# Patient Record
Sex: FEMALE | Race: BLACK OR AFRICAN AMERICAN | Employment: OTHER | URBAN - METROPOLITAN AREA
[De-identification: names, ages, dates, MRNs, and addresses within clinical notes are randomized per-mention and may not be internally consistent; named-entity substitution may affect disease eponyms.]

---

## 2017-07-27 ENCOUNTER — OFFICE VISIT (OUTPATIENT)
Dept: INTERNAL MEDICINE | Age: 77
End: 2017-07-27
Payer: MEDICARE

## 2017-07-27 VITALS
BODY MASS INDEX: 27.34 KG/M2 | DIASTOLIC BLOOD PRESSURE: 92 MMHG | HEART RATE: 77 BPM | SYSTOLIC BLOOD PRESSURE: 143 MMHG | HEIGHT: 62 IN | WEIGHT: 148.6 LBS

## 2017-07-27 DIAGNOSIS — Z13.9 SCREENING: ICD-10-CM

## 2017-07-27 DIAGNOSIS — G89.29 CHRONIC BILATERAL LOW BACK PAIN WITHOUT SCIATICA: ICD-10-CM

## 2017-07-27 DIAGNOSIS — E55.9 VITAMIN D DEFICIENCY: ICD-10-CM

## 2017-07-27 DIAGNOSIS — R73.03 PRE-DIABETES: ICD-10-CM

## 2017-07-27 DIAGNOSIS — Z00.00 PERIODIC HEALTH ASSESSMENT, GENERAL SCREENING, ADULT: ICD-10-CM

## 2017-07-27 DIAGNOSIS — M54.50 CHRONIC BILATERAL LOW BACK PAIN WITHOUT SCIATICA: ICD-10-CM

## 2017-07-27 DIAGNOSIS — I10 ESSENTIAL HYPERTENSION: Primary | ICD-10-CM

## 2017-07-27 LAB — HBA1C MFR BLD: 6.4 %

## 2017-07-27 PROCEDURE — 99204 OFFICE O/P NEW MOD 45 MIN: CPT | Performed by: INTERNAL MEDICINE

## 2017-07-27 PROCEDURE — 83036 HEMOGLOBIN GLYCOSYLATED A1C: CPT | Performed by: INTERNAL MEDICINE

## 2017-07-27 PROCEDURE — 99213 OFFICE O/P EST LOW 20 MIN: CPT

## 2017-07-27 RX ORDER — VALSARTAN 160 MG/1
160 TABLET ORAL DAILY
COMMUNITY
End: 2017-07-27 | Stop reason: SDUPTHER

## 2017-07-27 RX ORDER — FERROUS SULFATE 325(65) MG
325 TABLET ORAL
COMMUNITY
End: 2017-08-24

## 2017-07-27 RX ORDER — HYDRALAZINE HYDROCHLORIDE 25 MG/1
25 TABLET, FILM COATED ORAL 3 TIMES DAILY
COMMUNITY
End: 2017-07-27

## 2017-07-27 RX ORDER — GABAPENTIN 100 MG/1
100 CAPSULE ORAL 3 TIMES DAILY
Qty: 90 CAPSULE | Refills: 0 | Status: SHIPPED | OUTPATIENT
Start: 2017-07-27 | End: 2017-09-06 | Stop reason: SDUPTHER

## 2017-07-27 RX ORDER — VALSARTAN 160 MG/1
160 TABLET ORAL DAILY
Qty: 30 TABLET | Refills: 1 | Status: SHIPPED | OUTPATIENT
Start: 2017-07-27 | End: 2017-09-06 | Stop reason: SDUPTHER

## 2017-07-27 RX ORDER — OXYCODONE HYDROCHLORIDE AND ACETAMINOPHEN 5; 325 MG/1; MG/1
2 TABLET ORAL EVERY 6 HOURS PRN
COMMUNITY
End: 2017-08-24

## 2017-07-27 RX ORDER — PROCHLORPERAZINE MALEATE 10 MG
10 TABLET ORAL EVERY 6 HOURS PRN
COMMUNITY
End: 2017-08-24

## 2017-07-27 RX ORDER — ATORVASTATIN CALCIUM 20 MG/1
20 TABLET, FILM COATED ORAL DAILY
Qty: 30 TABLET | Refills: 3 | Status: SHIPPED | OUTPATIENT
Start: 2017-07-27 | End: 2020-08-10 | Stop reason: ALTCHOICE

## 2017-07-27 RX ORDER — GABAPENTIN 300 MG/1
300 CAPSULE ORAL DAILY
COMMUNITY
End: 2017-07-27

## 2017-07-27 RX ORDER — TAMSULOSIN HYDROCHLORIDE 0.4 MG/1
0.4 CAPSULE ORAL DAILY
COMMUNITY
End: 2017-08-24

## 2017-07-27 RX ORDER — MORPHINE SULFATE 15 MG/1
15 TABLET ORAL EVERY 12 HOURS
COMMUNITY
End: 2017-08-24

## 2017-07-27 RX ORDER — ATORVASTATIN CALCIUM 20 MG/1
20 TABLET, FILM COATED ORAL DAILY
COMMUNITY
End: 2017-07-27 | Stop reason: SDUPTHER

## 2017-07-27 RX ORDER — METFORMIN HYDROCHLORIDE 500 MG/1
500 TABLET, EXTENDED RELEASE ORAL
COMMUNITY
End: 2017-08-24

## 2017-07-27 RX ORDER — GABAPENTIN 100 MG/1
100 CAPSULE ORAL 3 TIMES DAILY
COMMUNITY
End: 2017-07-27 | Stop reason: SDUPTHER

## 2017-07-27 RX ORDER — HYDRALAZINE HYDROCHLORIDE 50 MG/1
50 TABLET, FILM COATED ORAL 3 TIMES DAILY
COMMUNITY
End: 2017-07-27 | Stop reason: SDUPTHER

## 2017-07-27 RX ORDER — BLOOD PRESSURE TEST KIT
KIT MISCELLANEOUS
Qty: 1 KIT | Refills: 0 | Status: SHIPPED | OUTPATIENT
Start: 2017-07-27 | End: 2017-08-24

## 2017-07-27 RX ORDER — ACETAMINOPHEN AND CODEINE PHOSPHATE 300; 30 MG/1; MG/1
1 TABLET ORAL DAILY PRN
Qty: 30 TABLET | Refills: 0 | Status: SHIPPED | OUTPATIENT
Start: 2017-07-27 | End: 2017-08-06

## 2017-07-27 RX ORDER — LORATADINE 10 MG/1
10 CAPSULE, LIQUID FILLED ORAL DAILY
COMMUNITY
End: 2017-08-24

## 2017-07-27 RX ORDER — HYDRALAZINE HYDROCHLORIDE 25 MG/1
25 TABLET, FILM COATED ORAL 3 TIMES DAILY
Qty: 90 TABLET | Refills: 0 | Status: SHIPPED | OUTPATIENT
Start: 2017-07-27 | End: 2017-09-06 | Stop reason: SDUPTHER

## 2017-07-27 ASSESSMENT — PATIENT HEALTH QUESTIONNAIRE - PHQ9
4. FEELING TIRED OR HAVING LITTLE ENERGY: 2
3. TROUBLE FALLING OR STAYING ASLEEP: 0
SUM OF ALL RESPONSES TO PHQ QUESTIONS 1-9: 4
5. POOR APPETITE OR OVEREATING: 2
6. FEELING BAD ABOUT YOURSELF - OR THAT YOU ARE A FAILURE OR HAVE LET YOURSELF OR YOUR FAMILY DOWN: 0
2. FEELING DOWN, DEPRESSED OR HOPELESS: 0
SUM OF ALL RESPONSES TO PHQ9 QUESTIONS 1 & 2: 0
8. MOVING OR SPEAKING SO SLOWLY THAT OTHER PEOPLE COULD HAVE NOTICED. OR THE OPPOSITE, BEING SO FIGETY OR RESTLESS THAT YOU HAVE BEEN MOVING AROUND A LOT MORE THAN USUAL: 0
7. TROUBLE CONCENTRATING ON THINGS, SUCH AS READING THE NEWSPAPER OR WATCHING TELEVISION: 0
9. THOUGHTS THAT YOU WOULD BE BETTER OFF DEAD, OR OF HURTING YOURSELF: 0
1. LITTLE INTEREST OR PLEASURE IN DOING THINGS: 0
10. IF YOU CHECKED OFF ANY PROBLEMS, HOW DIFFICULT HAVE THESE PROBLEMS MADE IT FOR YOU TO DO YOUR WORK, TAKE CARE OF THINGS AT HOME, OR GET ALONG WITH OTHER PEOPLE: 0

## 2017-07-27 ASSESSMENT — ENCOUNTER SYMPTOMS
DOUBLE VISION: 0
HEMOPTYSIS: 0
NAUSEA: 0
VOMITING: 0
PHOTOPHOBIA: 0
BLURRED VISION: 0
HEARTBURN: 0
COUGH: 0
BACK PAIN: 1

## 2017-08-11 ENCOUNTER — TELEPHONE (OUTPATIENT)
Dept: INTERNAL MEDICINE | Age: 77
End: 2017-08-11

## 2017-08-11 DIAGNOSIS — G89.29 CHRONIC BILATERAL LOW BACK PAIN WITHOUT SCIATICA: Primary | ICD-10-CM

## 2017-08-11 DIAGNOSIS — M54.50 CHRONIC BILATERAL LOW BACK PAIN WITHOUT SCIATICA: Primary | ICD-10-CM

## 2017-08-21 ENCOUNTER — HOSPITAL ENCOUNTER (OUTPATIENT)
Age: 77
Discharge: HOME | End: 2017-08-21
Payer: MEDICARE

## 2017-08-21 ENCOUNTER — HOSPITAL ENCOUNTER (OUTPATIENT)
Dept: GENERAL RADIOLOGY | Age: 77
Discharge: HOME | End: 2017-08-21
Payer: MEDICARE

## 2017-08-21 ENCOUNTER — HOSPITAL ENCOUNTER (OUTPATIENT)
Age: 77
Setting detail: SPECIMEN
Discharge: HOME | End: 2017-08-21
Payer: MEDICARE

## 2017-08-21 DIAGNOSIS — E55.9 VITAMIN D DEFICIENCY: ICD-10-CM

## 2017-08-21 DIAGNOSIS — G89.29 CHRONIC BILATERAL LOW BACK PAIN WITHOUT SCIATICA: ICD-10-CM

## 2017-08-21 DIAGNOSIS — M54.50 CHRONIC BILATERAL LOW BACK PAIN WITHOUT SCIATICA: ICD-10-CM

## 2017-08-21 DIAGNOSIS — I10 ESSENTIAL HYPERTENSION: ICD-10-CM

## 2017-08-21 DIAGNOSIS — Z00.00 PERIODIC HEALTH ASSESSMENT, GENERAL SCREENING, ADULT: ICD-10-CM

## 2017-08-21 DIAGNOSIS — D64.89 ANEMIA DUE TO OTHER CAUSE, NOT CLASSIFIED: Primary | ICD-10-CM

## 2017-08-21 LAB
ALBUMIN SERPL-MCNC: 4.1 G/DL (ref 3.5–5.2)
ALBUMIN/GLOBULIN RATIO: 1.2 (ref 1–2.5)
ALP BLD-CCNC: 94 U/L (ref 35–104)
ALT SERPL-CCNC: 23 U/L (ref 5–33)
ANION GAP SERPL CALCULATED.3IONS-SCNC: 13 MMOL/L (ref 9–17)
AST SERPL-CCNC: 26 U/L
BILIRUB SERPL-MCNC: 0.36 MG/DL (ref 0.3–1.2)
BUN BLDV-MCNC: 19 MG/DL (ref 8–23)
BUN/CREAT BLD: ABNORMAL (ref 9–20)
CALCIUM SERPL-MCNC: 9.7 MG/DL (ref 8.6–10.4)
CHLORIDE BLD-SCNC: 101 MMOL/L (ref 98–107)
CHOLESTEROL/HDL RATIO: 3.2
CHOLESTEROL: 142 MG/DL
CO2: 25 MMOL/L (ref 20–31)
CREAT SERPL-MCNC: 0.64 MG/DL (ref 0.5–0.9)
GFR AFRICAN AMERICAN: >60 ML/MIN
GFR NON-AFRICAN AMERICAN: >60 ML/MIN
GFR SERPL CREATININE-BSD FRML MDRD: ABNORMAL ML/MIN/{1.73_M2}
GFR SERPL CREATININE-BSD FRML MDRD: ABNORMAL ML/MIN/{1.73_M2}
GLUCOSE BLD-MCNC: 116 MG/DL (ref 70–99)
HCT VFR BLD CALC: 35.5 % (ref 36–46)
HDLC SERPL-MCNC: 45 MG/DL
HEMOGLOBIN: 11.4 G/DL (ref 12–16)
LDL CHOLESTEROL: 79 MG/DL (ref 0–130)
MCH RBC QN AUTO: 26.3 PG (ref 26–34)
MCHC RBC AUTO-ENTMCNC: 32 G/DL (ref 31–37)
MCV RBC AUTO: 82 FL (ref 80–100)
PDW BLD-RTO: 15 % (ref 12.5–15.4)
PLATELET # BLD: 241 K/UL (ref 140–450)
PMV BLD AUTO: 9.4 FL (ref 6–12)
POTASSIUM SERPL-SCNC: 4.1 MMOL/L (ref 3.7–5.3)
RBC # BLD: 4.34 M/UL (ref 4–5.2)
SODIUM BLD-SCNC: 139 MMOL/L (ref 135–144)
TOTAL PROTEIN: 7.4 G/DL (ref 6.4–8.3)
TRIGL SERPL-MCNC: 90 MG/DL
TSH SERPL DL<=0.05 MIU/L-ACNC: 1.06 MIU/L (ref 0.3–5)
VITAMIN D 25-HYDROXY: 28.9 NG/ML (ref 30–100)
VLDLC SERPL CALC-MCNC: NORMAL MG/DL (ref 1–30)
WBC # BLD: 6.1 K/UL (ref 3.5–11)

## 2017-08-21 PROCEDURE — 80061 LIPID PANEL: CPT

## 2017-08-21 PROCEDURE — 72100 X-RAY EXAM L-S SPINE 2/3 VWS: CPT

## 2017-08-21 PROCEDURE — 80053 COMPREHEN METABOLIC PANEL: CPT

## 2017-08-21 PROCEDURE — 82306 VITAMIN D 25 HYDROXY: CPT

## 2017-08-21 PROCEDURE — 85027 COMPLETE CBC AUTOMATED: CPT

## 2017-08-21 PROCEDURE — 84443 ASSAY THYROID STIM HORMONE: CPT

## 2017-08-21 PROCEDURE — 36415 COLL VENOUS BLD VENIPUNCTURE: CPT

## 2017-08-24 ENCOUNTER — OFFICE VISIT (OUTPATIENT)
Dept: INTERNAL MEDICINE | Age: 77
End: 2017-08-24
Payer: MEDICARE

## 2017-08-24 VITALS
DIASTOLIC BLOOD PRESSURE: 98 MMHG | HEART RATE: 88 BPM | BODY MASS INDEX: 26.83 KG/M2 | SYSTOLIC BLOOD PRESSURE: 161 MMHG | WEIGHT: 145.8 LBS | HEIGHT: 62 IN

## 2017-08-24 DIAGNOSIS — M54.50 CHRONIC BILATERAL LOW BACK PAIN WITHOUT SCIATICA: Primary | ICD-10-CM

## 2017-08-24 DIAGNOSIS — G89.29 CHRONIC BILATERAL LOW BACK PAIN WITHOUT SCIATICA: Primary | ICD-10-CM

## 2017-08-24 PROCEDURE — 99213 OFFICE O/P EST LOW 20 MIN: CPT

## 2017-08-24 PROCEDURE — 99213 OFFICE O/P EST LOW 20 MIN: CPT | Performed by: INTERNAL MEDICINE

## 2017-08-24 RX ORDER — ACETAMINOPHEN AND CODEINE PHOSPHATE 300; 30 MG/1; MG/1
1 TABLET ORAL EVERY 4 HOURS PRN
COMMUNITY
End: 2017-08-29 | Stop reason: SDUPTHER

## 2017-08-24 RX ORDER — TIZANIDINE 4 MG/1
4 TABLET ORAL NIGHTLY PRN
Qty: 30 TABLET | Refills: 0 | Status: SHIPPED | OUTPATIENT
Start: 2017-08-24 | End: 2020-08-10 | Stop reason: ALTCHOICE

## 2017-08-29 ENCOUNTER — TELEPHONE (OUTPATIENT)
Dept: INTERNAL MEDICINE | Age: 77
End: 2017-08-29

## 2017-08-29 RX ORDER — ACETAMINOPHEN AND CODEINE PHOSPHATE 300; 30 MG/1; MG/1
TABLET ORAL
Qty: 30 TABLET | Refills: 0 | OUTPATIENT
Start: 2017-08-29

## 2017-08-29 RX ORDER — ACETAMINOPHEN AND CODEINE PHOSPHATE 300; 30 MG/1; MG/1
1 TABLET ORAL EVERY 4 HOURS PRN
Qty: 30 TABLET | Refills: 0 | Status: SHIPPED | OUTPATIENT
Start: 2017-08-29 | End: 2017-09-06 | Stop reason: SDUPTHER

## 2017-09-06 ENCOUNTER — OFFICE VISIT (OUTPATIENT)
Dept: INTERNAL MEDICINE | Age: 77
End: 2017-09-06
Payer: MEDICARE

## 2017-09-06 VITALS
HEART RATE: 71 BPM | BODY MASS INDEX: 27.79 KG/M2 | WEIGHT: 151 LBS | DIASTOLIC BLOOD PRESSURE: 102 MMHG | SYSTOLIC BLOOD PRESSURE: 168 MMHG | HEIGHT: 62 IN

## 2017-09-06 DIAGNOSIS — I10 ESSENTIAL HYPERTENSION: ICD-10-CM

## 2017-09-06 DIAGNOSIS — E11.9 TYPE 2 DIABETES MELLITUS WITHOUT COMPLICATION, WITHOUT LONG-TERM CURRENT USE OF INSULIN (HCC): Primary | ICD-10-CM

## 2017-09-06 DIAGNOSIS — M54.50 CHRONIC BILATERAL LOW BACK PAIN WITHOUT SCIATICA: ICD-10-CM

## 2017-09-06 DIAGNOSIS — G89.29 CHRONIC BILATERAL LOW BACK PAIN WITHOUT SCIATICA: ICD-10-CM

## 2017-09-06 LAB — HBA1C MFR BLD: 6.5 %

## 2017-09-06 PROCEDURE — G8400 PT W/DXA NO RESULTS DOC: HCPCS | Performed by: INTERNAL MEDICINE

## 2017-09-06 PROCEDURE — 1090F PRES/ABSN URINE INCON ASSESS: CPT | Performed by: INTERNAL MEDICINE

## 2017-09-06 PROCEDURE — G8419 CALC BMI OUT NRM PARAM NOF/U: HCPCS | Performed by: INTERNAL MEDICINE

## 2017-09-06 PROCEDURE — 1036F TOBACCO NON-USER: CPT | Performed by: INTERNAL MEDICINE

## 2017-09-06 PROCEDURE — G8427 DOCREV CUR MEDS BY ELIG CLIN: HCPCS | Performed by: INTERNAL MEDICINE

## 2017-09-06 PROCEDURE — 99214 OFFICE O/P EST MOD 30 MIN: CPT | Performed by: INTERNAL MEDICINE

## 2017-09-06 PROCEDURE — 1123F ACP DISCUSS/DSCN MKR DOCD: CPT | Performed by: INTERNAL MEDICINE

## 2017-09-06 PROCEDURE — 99213 OFFICE O/P EST LOW 20 MIN: CPT

## 2017-09-06 PROCEDURE — 83036 HEMOGLOBIN GLYCOSYLATED A1C: CPT | Performed by: INTERNAL MEDICINE

## 2017-09-06 PROCEDURE — 4040F PNEUMOC VAC/ADMIN/RCVD: CPT | Performed by: INTERNAL MEDICINE

## 2017-09-06 RX ORDER — VALSARTAN 320 MG/1
320 TABLET ORAL DAILY
Qty: 30 TABLET | Refills: 5 | Status: SHIPPED | OUTPATIENT
Start: 2017-09-06 | End: 2020-08-10 | Stop reason: ALTCHOICE

## 2017-09-06 RX ORDER — GABAPENTIN 100 MG/1
100 CAPSULE ORAL 3 TIMES DAILY
Qty: 90 CAPSULE | Refills: 0 | Status: SHIPPED | OUTPATIENT
Start: 2017-09-06

## 2017-09-06 RX ORDER — ASPIRIN 81 MG/1
81 TABLET ORAL DAILY
Qty: 30 TABLET | Refills: 5 | Status: SHIPPED | OUTPATIENT
Start: 2017-09-06

## 2017-09-06 RX ORDER — ACETAMINOPHEN AND CODEINE PHOSPHATE 300; 30 MG/1; MG/1
1 TABLET ORAL EVERY 4 HOURS PRN
Qty: 30 TABLET | Refills: 0 | Status: SHIPPED | OUTPATIENT
Start: 2017-09-06

## 2017-09-06 RX ORDER — HYDRALAZINE HYDROCHLORIDE 25 MG/1
25 TABLET, FILM COATED ORAL 3 TIMES DAILY
Qty: 90 TABLET | Refills: 0 | Status: SHIPPED | OUTPATIENT
Start: 2017-09-06 | End: 2020-08-10 | Stop reason: ALTCHOICE

## 2017-09-06 ASSESSMENT — ENCOUNTER SYMPTOMS
BACK PAIN: 1
EYES NEGATIVE: 1
ALLERGIC/IMMUNOLOGIC NEGATIVE: 1
RESPIRATORY NEGATIVE: 1

## 2017-09-27 ENCOUNTER — TELEPHONE (OUTPATIENT)
Dept: INTERNAL MEDICINE | Age: 77
End: 2017-09-27

## 2017-09-27 NOTE — TELEPHONE ENCOUNTER
Pt given OC-Light Hemoccult test kit 8/24/17; it has not been returned within  2 weeks. PC to pt; Pt states she no longer wants to be a pt here. she wants stronger pain medication, feels like we dont care about her care. pt also states that the MD doesn't know what they are doing that Arthritis doesn't stay in one spot that it travels through the body. Pt wants her perks instead of the tylenol #3 that she was given.

## 2020-07-09 ENCOUNTER — HOSPITAL ENCOUNTER (EMERGENCY)
Age: 80
Discharge: HOME | End: 2020-07-09
Attending: EMERGENCY MEDICINE
Payer: MEDICARE

## 2020-07-09 ENCOUNTER — APPOINTMENT (OUTPATIENT)
Dept: GENERAL RADIOLOGY | Age: 80
End: 2020-07-09
Payer: MEDICARE

## 2020-07-09 VITALS
BODY MASS INDEX: 25.1 KG/M2 | RESPIRATION RATE: 13 BRPM | WEIGHT: 147 LBS | SYSTOLIC BLOOD PRESSURE: 169 MMHG | HEART RATE: 62 BPM | OXYGEN SATURATION: 97 % | HEIGHT: 64 IN | TEMPERATURE: 98.3 F | DIASTOLIC BLOOD PRESSURE: 86 MMHG

## 2020-07-09 LAB
ABSOLUTE EOS #: 0.23 K/UL (ref 0–0.44)
ABSOLUTE IMMATURE GRANULOCYTE: <0.03 K/UL (ref 0–0.3)
ABSOLUTE LYMPH #: 2.01 K/UL (ref 1.1–3.7)
ABSOLUTE MONO #: 0.65 K/UL (ref 0.1–1.2)
ALBUMIN SERPL-MCNC: 3.9 G/DL (ref 3.5–5.2)
ALBUMIN/GLOBULIN RATIO: 1.1 (ref 1–2.5)
ALP BLD-CCNC: 107 U/L (ref 35–104)
ALT SERPL-CCNC: 27 U/L (ref 5–33)
ANION GAP SERPL CALCULATED.3IONS-SCNC: 15 MMOL/L (ref 9–17)
AST SERPL-CCNC: 35 U/L
BASOPHILS # BLD: 1 % (ref 0–2)
BASOPHILS ABSOLUTE: 0.04 K/UL (ref 0–0.2)
BILIRUB SERPL-MCNC: 0.24 MG/DL (ref 0.3–1.2)
BILIRUBIN URINE: NEGATIVE
BNP INTERPRETATION: NORMAL
BUN BLDV-MCNC: 16 MG/DL (ref 8–23)
BUN/CREAT BLD: ABNORMAL (ref 9–20)
CALCIUM SERPL-MCNC: 9.3 MG/DL (ref 8.6–10.4)
CHLORIDE BLD-SCNC: 103 MMOL/L (ref 98–107)
CO2: 23 MMOL/L (ref 20–31)
COLOR: YELLOW
COMMENT UA: NORMAL
CREAT SERPL-MCNC: 0.66 MG/DL (ref 0.5–0.9)
DIFFERENTIAL TYPE: ABNORMAL
EOSINOPHILS RELATIVE PERCENT: 4 % (ref 1–4)
GFR AFRICAN AMERICAN: >60 ML/MIN
GFR NON-AFRICAN AMERICAN: >60 ML/MIN
GFR SERPL CREATININE-BSD FRML MDRD: ABNORMAL ML/MIN/{1.73_M2}
GFR SERPL CREATININE-BSD FRML MDRD: ABNORMAL ML/MIN/{1.73_M2}
GLUCOSE BLD-MCNC: 197 MG/DL (ref 70–99)
GLUCOSE URINE: NEGATIVE
HCT VFR BLD CALC: 36.3 % (ref 36.3–47.1)
HEMOGLOBIN: 10.7 G/DL (ref 11.9–15.1)
IMMATURE GRANULOCYTES: 0 %
KETONES, URINE: NEGATIVE
LEUKOCYTE ESTERASE, URINE: NEGATIVE
LYMPHOCYTES # BLD: 34 % (ref 24–43)
MCH RBC QN AUTO: 24.7 PG (ref 25.2–33.5)
MCHC RBC AUTO-ENTMCNC: 29.5 G/DL (ref 28.4–34.8)
MCV RBC AUTO: 83.8 FL (ref 82.6–102.9)
MONOCYTES # BLD: 11 % (ref 3–12)
NITRITE, URINE: NEGATIVE
NRBC AUTOMATED: 0 PER 100 WBC
PDW BLD-RTO: 14.2 % (ref 11.8–14.4)
PH UA: 6 (ref 5–8)
PLATELET # BLD: 321 K/UL (ref 138–453)
PLATELET ESTIMATE: ABNORMAL
PMV BLD AUTO: 10.4 FL (ref 8.1–13.5)
POTASSIUM SERPL-SCNC: 4.1 MMOL/L (ref 3.7–5.3)
PRO-BNP: 122 PG/ML
PROTEIN UA: NEGATIVE
RBC # BLD: 4.33 M/UL (ref 3.95–5.11)
RBC # BLD: ABNORMAL 10*6/UL
SEG NEUTROPHILS: 50 % (ref 36–65)
SEGMENTED NEUTROPHILS ABSOLUTE COUNT: 2.98 K/UL (ref 1.5–8.1)
SODIUM BLD-SCNC: 141 MMOL/L (ref 135–144)
SPECIFIC GRAVITY UA: 1.02 (ref 1–1.03)
TOTAL PROTEIN: 7.3 G/DL (ref 6.4–8.3)
TROPONIN INTERP: ABNORMAL
TROPONIN INTERP: ABNORMAL
TROPONIN T: ABNORMAL NG/ML
TROPONIN T: ABNORMAL NG/ML
TROPONIN, HIGH SENSITIVITY: 18 NG/L (ref 0–14)
TROPONIN, HIGH SENSITIVITY: 27 NG/L (ref 0–14)
TURBIDITY: CLEAR
URINE HGB: NEGATIVE
UROBILINOGEN, URINE: NORMAL
WBC # BLD: 5.9 K/UL (ref 3.5–11.3)
WBC # BLD: ABNORMAL 10*3/UL

## 2020-07-09 PROCEDURE — 80053 COMPREHEN METABOLIC PANEL: CPT

## 2020-07-09 PROCEDURE — 85025 COMPLETE CBC W/AUTO DIFF WBC: CPT

## 2020-07-09 PROCEDURE — 81003 URINALYSIS AUTO W/O SCOPE: CPT

## 2020-07-09 PROCEDURE — 83880 ASSAY OF NATRIURETIC PEPTIDE: CPT

## 2020-07-09 PROCEDURE — 71046 X-RAY EXAM CHEST 2 VIEWS: CPT

## 2020-07-09 PROCEDURE — 84484 ASSAY OF TROPONIN QUANT: CPT

## 2020-07-09 PROCEDURE — 99284 EMERGENCY DEPT VISIT MOD MDM: CPT

## 2020-07-09 RX ORDER — FOLIC ACID/MULTIVIT,IRON,MINER .4-18-35
1 TABLET,CHEWABLE ORAL DAILY
COMMUNITY

## 2020-07-09 RX ORDER — FUROSEMIDE 20 MG/1
20 TABLET ORAL 2 TIMES DAILY
COMMUNITY

## 2020-07-09 RX ORDER — AMLODIPINE BESYLATE 5 MG/1
5 TABLET ORAL DAILY
COMMUNITY

## 2020-07-09 RX ORDER — OXYCODONE HYDROCHLORIDE AND ACETAMINOPHEN 5; 325 MG/1; MG/1
1 TABLET ORAL EVERY 6 HOURS PRN
COMMUNITY

## 2020-07-09 ASSESSMENT — ENCOUNTER SYMPTOMS
SORE THROAT: 0
VOMITING: 0
NAUSEA: 0
ABDOMINAL PAIN: 0
SHORTNESS OF BREATH: 1

## 2020-07-09 NOTE — ED PROVIDER NOTES
Used   Substance and Sexual Activity    Alcohol use: Yes     Alcohol/week: 1.0 standard drinks     Types: 1 Cans of beer per week    Drug use: No    Sexual activity: Never   Lifestyle    Physical activity     Days per week: Not on file     Minutes per session: Not on file    Stress: Not on file   Relationships    Social connections     Talks on phone: Not on file     Gets together: Not on file     Attends Taoism service: Not on file     Active member of club or organization: Not on file     Attends meetings of clubs or organizations: Not on file     Relationship status: Not on file    Intimate partner violence     Fear of current or ex partner: Not on file     Emotionally abused: Not on file     Physically abused: Not on file     Forced sexual activity: Not on file   Other Topics Concern    Not on file   Social History Narrative    Not on file       History reviewed. No pertinent family history. Allergies:  Aleve [naproxen sodium]; Ibuprofen; and Shellfish-derived products    Home Medications:  Prior to Admission medications    Medication Sig Start Date End Date Taking? Authorizing Provider   amLODIPine (NORVASC) 5 MG tablet Take 5 mg by mouth daily   Yes Historical Provider, MD   furosemide (LASIX) 20 MG tablet Take 20 mg by mouth 2 times daily   Yes Historical Provider, MD   multivitamin-iron-minerals-folic acid (CENTRUM) chewable tablet Take 1 tablet by mouth daily   Yes Historical Provider, MD   oxyCODONE-acetaminophen (PERCOCET) 5-325 MG per tablet Take 1 tablet by mouth every 6 hours as needed for Pain.    Yes Historical Provider, MD   acetaminophen-codeine (TYLENOL #3) 300-30 MG per tablet Take 1 tablet by mouth every 4 hours as needed for Pain 9/6/17   Uriel Stout MD   gabapentin (NEURONTIN) 100 MG capsule Take 1 capsule by mouth 3 times daily 9/6/17   Uriel Stout MD   hydrALAZINE (APRESOLINE) 25 MG tablet Take 1 tablet by mouth 3 times daily 9/6/17   Uriel Stout MD valsartan (DIOVAN) 320 MG tablet Take 1 tablet by mouth daily 9/6/17   Brenda Saint, MD   aspirin EC 81 MG EC tablet Take 1 tablet by mouth daily 9/6/17   Brenda Saint, MD   metFORMIN (GLUCOPHAGE) 500 MG tablet Take 1 tablet by mouth daily (with breakfast) 9/6/17   Brenda Saint, MD   tiZANidine (ZANAFLEX) 4 MG tablet Take 1 tablet by mouth nightly as needed (BACK PAIN) 8/24/17   Lesley Jernigan MD   atorvastatin (LIPITOR) 20 MG tablet Take 1 tablet by mouth daily 7/27/17   Jered Pompa MD       REVIEW OF SYSTEMS    (2-9 systems for level 4, 10 or more for level 5)      Review of Systems   Constitutional: Positive for activity change. Negative for appetite change, chills, fatigue and fever. HENT: Negative for congestion and sore throat. Eyes: Negative for visual disturbance. Respiratory: Positive for shortness of breath. Dyspnea on exertion   Cardiovascular: Negative for chest pain. Gastrointestinal: Negative for abdominal pain, nausea and vomiting. Genitourinary: Negative for dysuria and hematuria. Musculoskeletal: Negative for gait problem. Neurological: Negative for dizziness, syncope and light-headedness. Psychiatric/Behavioral: Negative for agitation. PHYSICAL EXAM   (up to 7 for level 4, 8 or more for level 5)      INITIAL VITALS:   BP (!) 169/86   Pulse 62   Temp 98.3 °F (36.8 °C) (Oral)   Resp 13   Ht 5' 4\" (1.626 m)   Wt 147 lb (66.7 kg)   SpO2 97%   BMI 25.23 kg/m²     Physical Exam  Constitutional:       General: She is not in acute distress. Appearance: Normal appearance. She is not ill-appearing, toxic-appearing or diaphoretic. HENT:      Head: Normocephalic and atraumatic. Nose: Nose normal.      Mouth/Throat:      Mouth: Mucous membranes are moist.      Pharynx: No oropharyngeal exudate or posterior oropharyngeal erythema. Eyes:      Extraocular Movements: Extraocular movements intact.       Pupils: Pupils are equal, round, and reactive to light. Cardiovascular:      Pulses: Normal pulses. Heart sounds: Normal heart sounds. Pulmonary:      Effort: Pulmonary effort is normal.      Breath sounds: Normal breath sounds. Abdominal:      General: Abdomen is flat. There is no distension. Palpations: Abdomen is soft. Tenderness: There is no abdominal tenderness. There is no guarding. Musculoskeletal:         General: No tenderness. Right lower leg: Edema present. Left lower leg: Edema present. Neurological:      General: No focal deficit present. Mental Status: She is alert and oriented to person, place, and time. Comments: Ambulates well with walker   Psychiatric:         Mood and Affect: Mood normal.         Behavior: Behavior normal.         Thought Content: Thought content normal.         Judgment: Judgment normal.         DIFFERENTIAL  DIAGNOSIS     PLAN (LABS / IMAGING / EKG):  Orders Placed This Encounter   Procedures    XR CHEST STANDARD (2 VW)    CBC Auto Differential    Comprehensive Metabolic Panel    Brain Natriuretic Peptide    Urinalysis    Troponin    Troponin    EKG 12 Lead       MEDICATIONS ORDERED:  No orders of the defined types were placed in this encounter.       DDX: CHF exacerbation, ACS/MI, pneumonia    DIAGNOSTIC RESULTS / EMERGENCY DEPARTMENT COURSE / MDM   :  Results for orders placed or performed during the hospital encounter of 07/09/20   CBC Auto Differential   Result Value Ref Range    WBC 5.9 3.5 - 11.3 k/uL    RBC 4.33 3.95 - 5.11 m/uL    Hemoglobin 10.7 (L) 11.9 - 15.1 g/dL    Hematocrit 36.3 36.3 - 47.1 %    MCV 83.8 82.6 - 102.9 fL    MCH 24.7 (L) 25.2 - 33.5 pg    MCHC 29.5 28.4 - 34.8 g/dL    RDW 14.2 11.8 - 14.4 %    Platelets 602 287 - 882 k/uL    MPV 10.4 8.1 - 13.5 fL    NRBC Automated 0.0 0.0 per 100 WBC    Differential Type NOT REPORTED     Seg Neutrophils 50 36 - 65 %    Lymphocytes 34 24 - 43 %    Monocytes 11 3 - 12 %    Eosinophils % 4 1 - 4 % Basophils 1 0 - 2 %    Immature Granulocytes 0 0 %    Segs Absolute 2.98 1.50 - 8.10 k/uL    Absolute Lymph # 2.01 1.10 - 3.70 k/uL    Absolute Mono # 0.65 0.10 - 1.20 k/uL    Absolute Eos # 0.23 0.00 - 0.44 k/uL    Basophils Absolute 0.04 0.00 - 0.20 k/uL    Absolute Immature Granulocyte <0.03 0.00 - 0.30 k/uL    WBC Morphology NOT REPORTED     RBC Morphology NOT REPORTED     Platelet Estimate NOT REPORTED    Comprehensive Metabolic Panel   Result Value Ref Range    Glucose 197 (H) 70 - 99 mg/dL    BUN 16 8 - 23 mg/dL    CREATININE 0.66 0.50 - 0.90 mg/dL    Bun/Cre Ratio NOT REPORTED 9 - 20    Calcium 9.3 8.6 - 10.4 mg/dL    Sodium 141 135 - 144 mmol/L    Potassium 4.1 3.7 - 5.3 mmol/L    Chloride 103 98 - 107 mmol/L    CO2 23 20 - 31 mmol/L    Anion Gap 15 9 - 17 mmol/L    Alkaline Phosphatase 107 (H) 35 - 104 U/L    ALT 27 5 - 33 U/L    AST 35 (H) <32 U/L    Total Bilirubin 0.24 (L) 0.3 - 1.2 mg/dL    Total Protein 7.3 6.4 - 8.3 g/dL    Alb 3.9 3.5 - 5.2 g/dL    Albumin/Globulin Ratio 1.1 1.0 - 2.5    GFR Non-African American >60 >60 mL/min    GFR African American >60 >60 mL/min    GFR Comment          GFR Staging NOT REPORTED    Brain Natriuretic Peptide   Result Value Ref Range    Pro- <300 pg/mL    BNP Interpretation Pro-BNP Reference Range:    Urinalysis   Result Value Ref Range    Color, UA YELLOW YELLOW    Turbidity UA CLEAR CLEAR    Glucose, Ur NEGATIVE NEGATIVE    Bilirubin Urine NEGATIVE NEGATIVE    Ketones, Urine NEGATIVE NEGATIVE    Specific Gravity, UA 1.019 1.005 - 1.030    Urine Hgb NEGATIVE NEGATIVE    pH, UA 6.0 5.0 - 8.0    Protein, UA NEGATIVE NEGATIVE    Urobilinogen, Urine Normal Normal    Nitrite, Urine NEGATIVE NEGATIVE    Leukocyte Esterase, Urine NEGATIVE NEGATIVE    Urinalysis Comments       Microscopic exam not performed based on chemical results unless requested in original order.    Troponin   Result Value Ref Range    Troponin, High Sensitivity 27 (H) 0 - 14 ng/L    Troponin discussed    PROCEDURES:  None    CONSULTS:  None    CRITICAL CARE:  None    FINAL IMPRESSION      1. Fatigue, unspecified type    2.  Leg swelling          DISPOSITION / PLAN     DISPOSITION Decision To Discharge 07/09/2020 06:48:29 PM      PATIENT REFERRED TO:  MD Darvin Soria  [phone]    Schedule an appointment as soon as possible for a visit   As needed    [address] [place] [id number]  [phone]    As needed, If symptoms worsen    [facility]  [address] [address]  [phone]          DISCHARGE MEDICATIONS:  Discharge Medication List as of 7/9/2020  6:52 PM          Alex Sanchez DO  Emergency Medicine Resident    (Please note that portions of thisnote were completed with a voice recognition program.  Efforts were made to edit the dictations but occasionally words are mis-transcribed.)     Alex Sanchez DO  Resident  07/09/20 [id number]

## 2020-07-09 NOTE — ED PROVIDER NOTES
[facility] ED  eMERGENCY dEPARTMENT eNCOUnter   Attending Attestation     Pt Name: Corrie Chen  MRN: [id number]  [place] 1940  Date of evaluation: 7/9/20       Corrie Chen is a 79 y.o. female who presents with Fatigue      History: Patient presents with chronic sensation of heartbeat in her ears which has been going on for 6 weeks and bilateral lower extremity swelling for over 1 year. Patient has not wanted to go see her doctor because she does not agree with him anymore. Exam: Heart rate and rhythm are regular. Lungs are clear to auscultation bilaterally. Abdomen is soft, nontender. Patient has bilateral lower extremity edema. Patient is well-appearing and in no acute distress. Plan for cardiac evaluation and rule out of significant acute congestive heart failure. EKG shows sinus rhythm with first-degree AV block. There is left axis deviation. No ST elevations or depressions. No T wave inversions except in lead III. No other blocks or arrhythmias. Nonspecific EKG. I performed a history and physical examination of the patient and discussed management with the resident. I reviewed the residents note and agree with the documented findings and plan of care. Any areas of disagreement are noted on the chart. I was personally present for the key portions of any procedures. I have documented in the chart those procedures where I was not present during the key portions. I have personally reviewed all images and agree with the resident's interpretation. I have reviewed the emergency nurses triage note. I agree with the chief complaint, past medical history, past surgical history, allergies, medications, social and family history as documented unless otherwise noted below. Documentation of the HPI, Physical Exam and Medical Decision Making performed by medical students or scribes is based on my personal performance of the HPI, PE and MDM.  For Phys Assistant/ Nurse Practitioner

## 2020-07-09 NOTE — ED PROVIDER NOTES
[address] ED  Emergency Department  Faculty Sign-Out Addendum     Care of Kala Rankin was assumed from previous attending and is being seen for Fatigue  . The patient's initial evaluation and plan have been discussed with the prior provider who initially evaluated the patient. EMERGENCY DEPARTMENT COURSE / MEDICAL DECISION MAKING:       MEDICATIONS GIVEN:  No orders of the defined types were placed in this encounter. LABS / RADIOLOGY:     Labs Reviewed   CBC WITH AUTO DIFFERENTIAL - Abnormal; Notable for the following components:       Result Value    Hemoglobin 10.7 (*)     MCH 24.7 (*)     All other components within normal limits   COMPREHENSIVE METABOLIC PANEL - Abnormal; Notable for the following components:    Glucose 197 (*)     Alkaline Phosphatase 107 (*)     AST 35 (*)     Total Bilirubin 0.24 (*)     All other components within normal limits   TROPONIN - Abnormal; Notable for the following components:    Troponin, High Sensitivity 27 (*)     All other components within normal limits   BRAIN NATRIURETIC PEPTIDE   URINALYSIS   TROPONIN       Xr Chest Standard (2 Vw)    Result Date: 7/9/2020  EXAMINATION: TWO XRAY VIEWS OF THE CHEST 7/9/2020 3:05 pm COMPARISON: None. HISTORY: ORDERING SYSTEM PROVIDED HISTORY: SOB TECHNOLOGIST PROVIDED HISTORY: SOB Reason for Exam: SOB Acuity: Unknown Type of Exam: Unknown FINDINGS: The cardiomediastinal silhouette is within normal limits. There is no consolidation, pneumothorax or evidence for edema. No evidence for effusion. No acute osseous abnormality is identified. Thoracolumbar scoliosis. No acute airspace disease identified. RECENT VITALS:     Temp: 98.3 °F (36.8 °C),  Pulse: 62, Resp: 13, BP: (!) 169/86, SpO2: 97 %    This patient is a 79 y.o. Female with pulsing sensation in her head. Blood pressure slightly elevated. Has fatigue, chronic anemia, awaiting urinalysis. Anticipate discharge with follow-up.     OUTSTANDING TASKS / RECOMMENDATIONS:    1. Urinalysis  2. Reassess  3. Disposition        Torsten Reynaga.  Qing Troy MD, [facility]  Attending Emergency Physician  [address] ED        Josh Mcgregor MD  07/09/20 0284

## 2020-07-10 ENCOUNTER — CARE COORDINATION (OUTPATIENT)
Dept: CARE COORDINATION | Age: 80
End: 2020-07-10

## 2020-07-13 LAB
EKG ATRIAL RATE: 67 BPM
EKG P AXIS: 21 DEGREES
EKG P-R INTERVAL: 292 MS
EKG Q-T INTERVAL: 406 MS
EKG QRS DURATION: 78 MS
EKG QTC CALCULATION (BAZETT): 429 MS
EKG R AXIS: -39 DEGREES
EKG T AXIS: -12 DEGREES
EKG VENTRICULAR RATE: 67 BPM

## 2020-07-17 ENCOUNTER — CARE COORDINATION (OUTPATIENT)
Dept: CARE COORDINATION | Age: 80
End: 2020-07-17

## 2020-07-23 ENCOUNTER — CARE COORDINATION (OUTPATIENT)
Dept: CARE COORDINATION | Age: 80
End: 2020-07-23

## 2020-08-10 ENCOUNTER — OFFICE VISIT (OUTPATIENT)
Dept: PRIMARY CARE CLINIC | Age: 80
End: 2020-08-10
Payer: MEDICARE

## 2020-08-10 VITALS
TEMPERATURE: 97.3 F | SYSTOLIC BLOOD PRESSURE: 139 MMHG | OXYGEN SATURATION: 98 % | DIASTOLIC BLOOD PRESSURE: 80 MMHG | BODY MASS INDEX: 25.23 KG/M2 | HEART RATE: 67 BPM | WEIGHT: 147 LBS

## 2020-08-10 PROCEDURE — 1123F ACP DISCUSS/DSCN MKR DOCD: CPT | Performed by: NURSE PRACTITIONER

## 2020-08-10 PROCEDURE — G8417 CALC BMI ABV UP PARAM F/U: HCPCS | Performed by: NURSE PRACTITIONER

## 2020-08-10 PROCEDURE — G8400 PT W/DXA NO RESULTS DOC: HCPCS | Performed by: NURSE PRACTITIONER

## 2020-08-10 PROCEDURE — 99203 OFFICE O/P NEW LOW 30 MIN: CPT | Performed by: NURSE PRACTITIONER

## 2020-08-10 PROCEDURE — 4040F PNEUMOC VAC/ADMIN/RCVD: CPT | Performed by: NURSE PRACTITIONER

## 2020-08-10 PROCEDURE — 1090F PRES/ABSN URINE INCON ASSESS: CPT | Performed by: NURSE PRACTITIONER

## 2020-08-10 PROCEDURE — G8427 DOCREV CUR MEDS BY ELIG CLIN: HCPCS | Performed by: NURSE PRACTITIONER

## 2020-08-10 PROCEDURE — 1036F TOBACCO NON-USER: CPT | Performed by: NURSE PRACTITIONER

## 2020-08-10 PROCEDURE — 1111F DSCHRG MED/CURRENT MED MERGE: CPT | Performed by: NURSE PRACTITIONER

## 2020-08-10 ASSESSMENT — PATIENT HEALTH QUESTIONNAIRE - PHQ9
2. FEELING DOWN, DEPRESSED OR HOPELESS: 0
SUM OF ALL RESPONSES TO PHQ9 QUESTIONS 1 & 2: 0
SUM OF ALL RESPONSES TO PHQ QUESTIONS 1-9: 0
SUM OF ALL RESPONSES TO PHQ QUESTIONS 1-9: 0
1. LITTLE INTEREST OR PLEASURE IN DOING THINGS: 0

## 2020-08-10 NOTE — PROGRESS NOTES
Visit Information    Have you changed or started any medications since your last visit including any over-the-counter medicines, vitamins, or herbal medicines? no   Are you having any side effects from any of your medications? -  no  Have you stopped taking any of your medications? Is so, why? -  no    Have you seen any other physician or provider since your last visit? No  Have you had any other diagnostic tests since your last visit? Yes - Records Requested  Have you been seen in the emergency room and/or had an admission to a hospital since we last saw you? Yes - Records Requested  Have you had your routine dental cleaning in the past 6 months? no    Have you activated your [employer] account? If not, what are your barriers?  No     Patient Care Team:  ANGELO Contreras - CNP as PCP - General (Family Medicine)    Medical History Review  Past Medical, Family, and Social History reviewed and does not contribute to the patient presenting condition    Health Maintenance   Topic Date Due    DTaP/Tdap/Td vaccine (1 - Tdap) 05/01/1958    Shingles Vaccine (1 of 2) 05/01/1989    DEXA (modify frequency per FRAX score)  05/01/1994    Pneumococcal 65+ years Vaccine (2 of 2 - PPSV23) 05/01/2004    Lipid screen  08/21/2018    Annual Wellness Visit (AWV)  06/23/2019    Flu vaccine (1) 09/01/2020    Potassium monitoring  07/09/2021    Creatinine monitoring  07/09/2021    Hepatitis A vaccine  Aged Out    Hib vaccine  Aged Out    Meningococcal (ACWY) vaccine  Aged Out

## 2020-08-10 NOTE — PROGRESS NOTES
Post-Discharge Transitional Care Management Services or Hospital Follow Up      Cindy Velazco   YOB: 1940    Date of Office Visit:  8/10/2020  Date of Hospital Admission: 7/9/20  Date of Hospital Discharge: 7/9/20  Readmission Risk Score(high >=14%. Medium >=10%):No data recorded    Care management risk score Rising risk (score 2-5) and Complex Care (Scores >=6): 1     Non face to face  following discharge, date last encounter closed (first attempt may have been earlier): *No documented post hospital discharge outreach found in the last 14 days *No documented post hospital discharge outreach found in the last 14 days    Call initiated 2 business days of discharge: *No response recorded in the last 14 days     Patient Active Problem List   Diagnosis    Chronic bilateral low back pain without sciatica    Essential hypertension    Type 2 diabetes mellitus without complication, without long-term current use of insulin ([facility])       Allergies   Allergen Reactions    Aleve [Naproxen Sodium]     Ibuprofen     Nsaids     Shellfish-Derived Products        Medications listed as ordered at the time of discharge from [address] Medication Instructions CHELSIE:    Printed on:08/10/20 0025   Medication Information                      acetaminophen-codeine (TYLENOL #3) 300-30 MG per tablet  Take 1 tablet by mouth every 4 hours as needed for Pain             amLODIPine (NORVASC) 5 MG tablet  Take 5 mg by mouth daily             aspirin EC 81 MG EC tablet  Take 1 tablet by mouth daily             furosemide (LASIX) 20 MG tablet  Take 20 mg by mouth 2 times daily             gabapentin (NEURONTIN) 100 MG capsule  Take 1 capsule by mouth 3 times daily             multivitamin-iron-minerals-folic acid (CENTRUM) chewable tablet  Take 1 tablet by mouth daily             oxyCODONE-acetaminophen (PERCOCET) 5-325 MG per tablet  Take 1 tablet by mouth every 6 hours as needed for Pain. Medications marked \"taking\" at this time  Outpatient Medications Marked as Taking for the 8/10/20 encounter (Office Visit) with ANGELO Pa CNP   Medication Sig Dispense Refill    amLODIPine (NORVASC) 5 MG tablet Take 5 mg by mouth daily      furosemide (LASIX) 20 MG tablet Take 20 mg by mouth 2 times daily      multivitamin-iron-minerals-folic acid (CENTRUM) chewable tablet Take 1 tablet by mouth daily      oxyCODONE-acetaminophen (PERCOCET) 5-325 MG per tablet Take 1 tablet by mouth every 6 hours as needed for Pain.  gabapentin (NEURONTIN) 100 MG capsule Take 1 capsule by mouth 3 times daily 90 capsule 0    aspirin EC 81 MG EC tablet Take 1 tablet by mouth daily 30 tablet 5        Medications patient taking as of now reconciled against medications ordered at time of hospital discharge: Yes    Chief Complaint   Patient presents with   [address]     pt would like to have her left side of her back checked out       Ms. Deep Rios is an 48-year-old -American female here today to establish care. She was recently seen in the [address] [place] [address] ER on July 9 with complaints of shortness of breath and bilateral foot swelling. History includes CHF hypertension, hyperlipidemia and diabetes. Her last PCP is at [facility], she does not wish to see residents. Admits to a history of RA. Kyra Turk does not take any medication for her diabetes, she does not check sugars. Exercises and watches her weight and controls it that way. Chest x-ray was normal in the emergency room EKG with sinus rhythm and first-degree AV block, no ST elevation. She currently takes 4 Percocet a day for chronic low back pain, that she has a \"curved\" spine. Had an MRI  Also had shots in her back in the past, about 5 different treatments. All done at [facility]    She states she lived in [facility], in a nursing home, for 10 years.  Decided to move up this way, older sister lived in the area    She is struggling with leg swelling, tries to keep legs elevated. Current compression stockings do not fit properly, last set was 3 years ago        Inpatient course: Discharge summary reviewed- see chart. Interval history/Current status: stable    Review of Systems    Vitals:    08/10/20 1322   BP: 139/80   Site: Left Upper Arm   Position: Sitting   Cuff Size: Medium Adult   Pulse: 67   Temp: 97.3 °F (36.3 °C)   SpO2: 98%   Weight: 147 lb (66.7 kg)     Body mass index is 25.23 kg/m². Wt Readings from Last 3 Encounters:   08/10/20 147 lb (66.7 kg)   07/09/20 147 lb (66.7 kg)   09/06/17 151 lb (68.5 kg)     BP Readings from Last 3 Encounters:   08/10/20 139/80   07/09/20 (!) 169/86   09/06/17 (!) 168/102       Physical Exam        Assessment/Plan:  1. Essential hypertension  Blood pressure currently at goal.    2. Bilateral leg edema  Patient currently on Lasix twice daily. Discussed switching from amlodipine to lisinopril or losartan given risk pedal edema. Patient declines at this time, states losartan caused her \"kidneys to shut down\". Did not urinate for 3 days when on losartan. - MS DISCHARGE MEDS RECONCILED W/ CURRENT OUTPATIENT MED LIST  - Compression Stocking    3. Chronic congestive heart failure, unspecified heart failure type [facility])  Patient declines use of ACE or ARB. - Compression Stocking    4. Type 2 diabetes mellitus without complication, without long-term current use of insulin ([facility] [address]  Request records from previous PCP, patient declines treatment for diabetes. 5. Chronic bilateral low back pain without sciatica  Concern with long-term use of Percocet. Per [facility], she is receiving 128 Percocet monthly from her PCP at [facility]. I discussed with the patient that I am not comfortable with this level of narcotic pain medication in the primary care setting. The patient does states she has had previous evaluations through pain management which did include spinal injections.   Records from [facility] requested, patient agrees to

## 2021-10-06 ENCOUNTER — HOSPITAL ENCOUNTER (OUTPATIENT)
Age: 81
Setting detail: SPECIMEN
Discharge: HOME | End: 2021-10-06
Payer: MEDICARE

## 2021-10-06 LAB
ANION GAP SERPL CALCULATED.3IONS-SCNC: 9 MMOL/L (ref 9–17)
BUN BLDV-MCNC: 17 MG/DL (ref 8–23)
BUN/CREAT BLD: ABNORMAL (ref 9–20)
CALCIUM SERPL-MCNC: 9.3 MG/DL (ref 8.6–10.4)
CHLORIDE BLD-SCNC: 104 MMOL/L (ref 98–107)
CO2: 25 MMOL/L (ref 20–31)
CREAT SERPL-MCNC: 0.76 MG/DL (ref 0.5–0.9)
GFR AFRICAN AMERICAN: >60 ML/MIN
GFR NON-AFRICAN AMERICAN: >60 ML/MIN
GFR SERPL CREATININE-BSD FRML MDRD: ABNORMAL ML/MIN/{1.73_M2}
GFR SERPL CREATININE-BSD FRML MDRD: ABNORMAL ML/MIN/{1.73_M2}
GLUCOSE BLD-MCNC: 109 MG/DL (ref 70–99)
HCT VFR BLD CALC: 35.5 % (ref 36.3–47.1)
HEMOGLOBIN: 10.5 G/DL (ref 11.9–15.1)
MCH RBC QN AUTO: 24.9 PG (ref 25.2–33.5)
MCHC RBC AUTO-ENTMCNC: 29.6 G/DL (ref 28.4–34.8)
MCV RBC AUTO: 84.3 FL (ref 82.6–102.9)
NRBC AUTOMATED: 0 PER 100 WBC
PDW BLD-RTO: 15.3 % (ref 11.8–14.4)
PLATELET # BLD: 325 K/UL (ref 138–453)
PMV BLD AUTO: 11 FL (ref 8.1–13.5)
POTASSIUM SERPL-SCNC: 4.1 MMOL/L (ref 3.7–5.3)
RBC # BLD: 4.21 M/UL (ref 3.95–5.11)
SODIUM BLD-SCNC: 138 MMOL/L (ref 135–144)
WBC # BLD: 9.4 K/UL (ref 3.5–11.3)

## 2021-10-06 PROCEDURE — P9603 ONE-WAY ALLOW PRORATED MILES: HCPCS

## 2021-10-06 PROCEDURE — 36415 COLL VENOUS BLD VENIPUNCTURE: CPT

## 2021-10-06 PROCEDURE — 80048 BASIC METABOLIC PNL TOTAL CA: CPT

## 2021-10-06 PROCEDURE — 85027 COMPLETE CBC AUTOMATED: CPT

## 2025-07-11 ENCOUNTER — HOSPITAL ENCOUNTER (EMERGENCY)
Facility: CLINIC | Age: 85
Discharge: HOME | End: 2025-07-11
Attending: EMERGENCY MEDICINE
Payer: MEDICARE

## 2025-07-11 ENCOUNTER — APPOINTMENT (OUTPATIENT)
Dept: GENERAL RADIOLOGY | Facility: CLINIC | Age: 85
End: 2025-07-11
Payer: MEDICARE

## 2025-07-11 VITALS
RESPIRATION RATE: 18 BRPM | SYSTOLIC BLOOD PRESSURE: 178 MMHG | HEIGHT: 64 IN | TEMPERATURE: 98.3 F | BODY MASS INDEX: 23.17 KG/M2 | OXYGEN SATURATION: 97 % | DIASTOLIC BLOOD PRESSURE: 83 MMHG | WEIGHT: 135.7 LBS | HEART RATE: 68 BPM | BODY MASS INDEX: 23.17 KG/M2 | RESPIRATION RATE: 18 BRPM | OXYGEN SATURATION: 97 % | HEART RATE: 68 BPM | DIASTOLIC BLOOD PRESSURE: 83 MMHG | TEMPERATURE: 98.3 F | HEIGHT: 64 IN | SYSTOLIC BLOOD PRESSURE: 178 MMHG | WEIGHT: 135.7 LBS

## 2025-07-11 DIAGNOSIS — R60.0 LEG EDEMA: Primary | ICD-10-CM

## 2025-07-11 DIAGNOSIS — D64.9 ANEMIA, UNSPECIFIED TYPE: ICD-10-CM

## 2025-07-11 DIAGNOSIS — N28.9 RENAL INSUFFICIENCY: ICD-10-CM

## 2025-07-11 LAB
ANION GAP SERPL CALCULATED.3IONS-SCNC: 13 MMOL/L (ref 9–16)
ANION GAP SERPL CALCULATED.3IONS-SCNC: 13 MMOL/L (ref 9–16)
BASOPHILS # BLD: 0.07 K/UL (ref 0–0.2)
BASOPHILS # BLD: 0.07 K/UL (ref 0–0.2)
BASOPHILS NFR BLD: 1 % (ref 0–2)
BASOPHILS NFR BLD: 1 % (ref 0–2)
BNP SERPL-MCNC: 167 PG/ML (ref 0–300)
BNP SERPL-MCNC: 167 PG/ML (ref 0–300)
BUN SERPL-MCNC: 26 MG/DL (ref 8–23)
BUN SERPL-MCNC: 26 MG/DL (ref 8–23)
CALCIUM SERPL-MCNC: 9.9 MG/DL (ref 8.6–10.4)
CALCIUM SERPL-MCNC: 9.9 MG/DL (ref 8.6–10.4)
CHLORIDE SERPL-SCNC: 105 MMOL/L (ref 98–107)
CHLORIDE SERPL-SCNC: 105 MMOL/L (ref 98–107)
CO2 SERPL-SCNC: 24 MMOL/L (ref 20–31)
CO2 SERPL-SCNC: 24 MMOL/L (ref 20–31)
CREAT SERPL-MCNC: 1.1 MG/DL (ref 0.5–0.9)
CREAT SERPL-MCNC: 1.1 MG/DL (ref 0.5–0.9)
EOSINOPHIL # BLD: 0.07 K/UL (ref 0–0.4)
EOSINOPHIL # BLD: 0.07 K/UL (ref 0–0.4)
EOSINOPHILS RELATIVE PERCENT: 1 % (ref 1–4)
EOSINOPHILS RELATIVE PERCENT: 1 % (ref 1–4)
ERYTHROCYTE [DISTWIDTH] IN BLOOD BY AUTOMATED COUNT: 15.3 % (ref 12.5–15.4)
ERYTHROCYTE [DISTWIDTH] IN BLOOD BY AUTOMATED COUNT: 15.3 % (ref 12.5–15.4)
GFR, ESTIMATED: 49 ML/MIN/1.73M2
GFR, ESTIMATED: 49 ML/MIN/1.73M2
GLUCOSE SERPL-MCNC: 156 MG/DL (ref 74–99)
GLUCOSE SERPL-MCNC: 156 MG/DL (ref 74–99)
HCT VFR BLD AUTO: 32.8 % (ref 36–46)
HCT VFR BLD AUTO: 32.8 % (ref 36–46)
HGB BLD-MCNC: 10.2 G/DL (ref 12–16)
HGB BLD-MCNC: 10.2 G/DL (ref 12–16)
LYMPHOCYTES NFR BLD: 1.34 K/UL (ref 1–4.8)
LYMPHOCYTES NFR BLD: 1.34 K/UL (ref 1–4.8)
LYMPHOCYTES RELATIVE PERCENT: 20 % (ref 24–44)
LYMPHOCYTES RELATIVE PERCENT: 20 % (ref 24–44)
MCH RBC QN AUTO: 25.7 PG (ref 26–34)
MCH RBC QN AUTO: 25.7 PG (ref 26–34)
MCHC RBC AUTO-ENTMCNC: 31.2 G/DL (ref 31–37)
MCHC RBC AUTO-ENTMCNC: 31.2 G/DL (ref 31–37)
MCV RBC AUTO: 82.3 FL (ref 80–100)
MCV RBC AUTO: 82.3 FL (ref 80–100)
MONOCYTES NFR BLD: 0.54 K/UL (ref 0.1–1.2)
MONOCYTES NFR BLD: 0.54 K/UL (ref 0.1–1.2)
MONOCYTES NFR BLD: 8 % (ref 2–11)
MONOCYTES NFR BLD: 8 % (ref 2–11)
MORPHOLOGY: ABNORMAL
MORPHOLOGY: ABNORMAL
NEUTROPHILS NFR BLD: 70 % (ref 36–66)
NEUTROPHILS NFR BLD: 70 % (ref 36–66)
NEUTS SEG NFR BLD: 4.68 K/UL (ref 1.8–7.7)
NEUTS SEG NFR BLD: 4.68 K/UL (ref 1.8–7.7)
PLATELET # BLD AUTO: 230 K/UL (ref 140–450)
PLATELET # BLD AUTO: 230 K/UL (ref 140–450)
PMV BLD AUTO: 8.5 FL (ref 6–12)
PMV BLD AUTO: 8.5 FL (ref 6–12)
POTASSIUM SERPL-SCNC: 4.1 MMOL/L (ref 3.7–5.3)
POTASSIUM SERPL-SCNC: 4.1 MMOL/L (ref 3.7–5.3)
RBC # BLD AUTO: 3.99 M/UL (ref 4–5.2)
RBC # BLD AUTO: 3.99 M/UL (ref 4–5.2)
SODIUM SERPL-SCNC: 142 MMOL/L (ref 136–145)
SODIUM SERPL-SCNC: 142 MMOL/L (ref 136–145)
TROPONIN I SERPL HS-MCNC: 34 NG/L (ref 0–14)
TROPONIN I SERPL HS-MCNC: 34 NG/L (ref 0–14)
TROPONIN I SERPL HS-MCNC: 36 NG/L (ref 0–14)
TROPONIN I SERPL HS-MCNC: 36 NG/L (ref 0–14)
WBC OTHER # BLD: 6.7 K/UL (ref 3.5–11)
WBC OTHER # BLD: 6.7 K/UL (ref 3.5–11)

## 2025-07-11 PROCEDURE — 93005 ELECTROCARDIOGRAM TRACING: CPT | Performed by: EMERGENCY MEDICINE

## 2025-07-11 PROCEDURE — 84484 ASSAY OF TROPONIN QUANT: CPT

## 2025-07-11 PROCEDURE — 99285 EMERGENCY DEPT VISIT HI MDM: CPT

## 2025-07-11 PROCEDURE — 71045 X-RAY EXAM CHEST 1 VIEW: CPT

## 2025-07-11 PROCEDURE — 36415 COLL VENOUS BLD VENIPUNCTURE: CPT

## 2025-07-11 PROCEDURE — 85025 COMPLETE CBC W/AUTO DIFF WBC: CPT

## 2025-07-11 PROCEDURE — 80048 BASIC METABOLIC PNL TOTAL CA: CPT

## 2025-07-11 PROCEDURE — 83880 ASSAY OF NATRIURETIC PEPTIDE: CPT

## 2025-07-11 RX ORDER — FUROSEMIDE 40 MG/1
40 TABLET ORAL DAILY
COMMUNITY
Start: 2025-06-02

## 2025-07-11 RX ORDER — PANTOPRAZOLE SODIUM 40 MG/1
40 TABLET, DELAYED RELEASE ORAL DAILY
COMMUNITY
Start: 2025-06-21

## 2025-07-11 RX ORDER — SUCRALFATE 1 G/1
1 TABLET ORAL 4 TIMES DAILY
COMMUNITY
Start: 2025-06-23

## 2025-07-11 RX ORDER — ONDANSETRON 4 MG/1
4 TABLET, ORALLY DISINTEGRATING ORAL EVERY 8 HOURS PRN
COMMUNITY
Start: 2025-06-21

## 2025-07-11 RX ORDER — VALSARTAN 320 MG/1
320 TABLET ORAL EVERY MORNING
COMMUNITY
Start: 2025-05-13

## 2025-07-11 RX ORDER — HYDROCHLOROTHIAZIDE 12.5 MG/1
12.5 TABLET ORAL EVERY MORNING
COMMUNITY
Start: 2025-05-13

## 2025-07-11 RX ORDER — PREDNISONE 5 MG/1
5 TABLET ORAL DAILY
COMMUNITY
Start: 2025-06-17

## 2025-07-11 RX ORDER — OXYCODONE HYDROCHLORIDE 5 MG/1
5 TABLET ORAL EVERY 4 HOURS PRN
COMMUNITY
Start: 2025-06-11

## 2025-07-11 RX ORDER — ASPIRIN 81 MG/1
81 TABLET ORAL DAILY
COMMUNITY

## 2025-07-11 ASSESSMENT — PAIN - FUNCTIONAL ASSESSMENT
PAIN_FUNCTIONAL_ASSESSMENT: NONE - DENIES PAIN
PAIN_FUNCTIONAL_ASSESSMENT: NONE - DENIES PAIN

## 2025-07-11 NOTE — ED PROVIDER NOTES
[facility]  EMERGENCY DEPARTMENT ENCOUNTER      Pt Name: Shu Honeycutt  MRN: [id number]  Birthdate 1940  Date of evaluation: 7/11/2025    CHIEF COMPLAINT       Chief Complaint   Patient presents with    Leg Swelling    Shortness of Breath     Pt c/o increased swelling to bilat legs x one week. Pt called PCP and instructed to take extra diuretic med this morning. Pt states SOB with ambulation. Pt ambulated with steady gait to room with personal walker with wheels from waiting room. No distress noted. Pt speaking in complete sentences.         HISTORY OF PRESENT ILLNESS      Shu Honeycutt is a 85 y.o. female who presents to the emergency department complaining of bilateral lower extremity edema worsening over the past week.  She has a history of congestive heart failure called her doctor today who told her to start taking an additional dose of Lasix 1 in the morning and 1 at night.  Today she took 2 this morning because she did not want to be up a bunch throughout the night.  She denies any chest pain but does admit to some shortness of breath.  No fevers or chills cough or congestion.  She denies any other relevant symptoms.  No leg pain or asymmetry.     REVIEW OF SYSTEMS       CONSTITUTIONAL: No fevers or chills   HENT: No sore throat, rhinorrhea, or earache   EYES: No blurry vision or double vision no drainage   CARDIOVASCULAR: No chest pain or tachycardia   RESPIRATORY: No wheezing positive shortness of breath no cough   GASTROINTESTINAL: No nausea, vomiting, diarrhea, constipation, or abdominal pain   : No hematuria or dysuria   MUSCULOSKELETAL: Positive bilateral lower extremity swelling no pain   SKIN: No rash   NEUROLOGICAL: No focal neurologic complaints, paresthesias, weakness, or headache      PAST MEDICAL HISTORY    has no past medical history on file.    SURGICAL HISTORY      has no past surgical history on file.    CURRENT MEDICATIONS       Previous Medications    No  visit on 07/11/25.    Not indicated unless otherwise documented above    RADIOLOGY:   I reviewed the radiologist interpretations and visualized all plain films:    XR CHEST PORTABLE    (Results Pending)       Not indicated unless otherwise documented above    EMERGENCY DEPARTMENT COURSE/ ADDITIONAL MEDICAL DECISION MAKING     The patient was given the following medications:  No orders of the defined types were placed in this encounter.       Vitals:   -------------------------  BP (!) 195/103   Pulse 77   Temp 98.3 °F (36.8 °C) (Oral)   Resp 20   Ht 1.626 m (5' 4\")   Wt 61.6 kg (135 lb 11.2 oz)   SpO2 97%   BMI 23.29 kg/m²     ED Course as of 07/11/25 1910 Fri Jul 11, 2025 1853 At 7:10 PM care will be transferred to  [TH]   1909 EKG ER physician interpretation 1904 sinus rhythm rate 74 first-degree AV block  QRS 78  no acute ST or T wave changes [TH]      ED Course User Index  [TH] Tim Bustamante DO       PROCEDURES:    None      OARRS Report if indicated         (Please note that portions of this note were completed with a voice recognition program.  Efforts were made to edit the dictations but occasionally words are mis-transcribed.  Additionally, portions of this note may also include information that was incorporated after care transfer to another provider that were not available at the time of my evaluation.  Some of this information could likely include laboratory values, vital sign updates, medications etc.)     Tim Bustamante DO   Attending Emergency Physician      Tim Bustamante,   07/11/25 1853       Tim Bustamante DO  07/11/25 1910

## 2025-07-12 LAB
EKG ATRIAL RATE: 74 BPM
EKG ATRIAL RATE: 74 BPM
EKG P AXIS: 90 DEGREES
EKG P AXIS: 90 DEGREES
EKG P-R INTERVAL: 252 MS
EKG P-R INTERVAL: 252 MS
EKG Q-T INTERVAL: 400 MS
EKG Q-T INTERVAL: 400 MS
EKG QRS DURATION: 78 MS
EKG QRS DURATION: 78 MS
EKG QTC CALCULATION (BAZETT): 444 MS
EKG QTC CALCULATION (BAZETT): 444 MS
EKG R AXIS: -49 DEGREES
EKG R AXIS: -49 DEGREES
EKG T AXIS: -29 DEGREES
EKG T AXIS: -29 DEGREES
EKG VENTRICULAR RATE: 74 BPM
EKG VENTRICULAR RATE: 74 BPM

## 2025-07-12 NOTE — ED PROVIDER NOTES
ADDENDUM:      Care of this patient was assumed from Dr. Bustamante.  The patient was seen for Leg Swelling and Shortness of Breath (Pt c/o increased swelling to bilat legs x one week. Pt called PCP and instructed to take extra diuretic med this morning. Pt states SOB with ambulation. Pt ambulated with steady gait to room with personal walker with wheels from waiting room. No distress noted. Pt speaking in complete sentences. )  .  The patient's initial evaluation and plan have been discussed with the prior provider who initially evaluated the patient.  Nursing Notes, Past Medical Hx, Past Surgical Hx, Social Hx, Allergies, and Family Hx were all reviewed.      Diagnostic Results       RADIOLOGY:   Non-plain film images such as CT, Ultrasound and MRI are read by the radiologist. Plain radiographic images are visualized and the radiologist interpretations are reviewed as follows:     XR CHEST PORTABLE  Result Date: 7/11/2025  EXAMINATION: ONE XRAY VIEW OF THE CHEST 7/11/2025 7:13 pm COMPARISON: None. HISTORY: Acute shortness of breath and leg swelling x 1 week. FINDINGS: Patient is mildly rotated.  Normal cardiomediastinal silhouette.  Solid a shin, pleural effusion, or pneumothorax.     No definite acute abnormality allowing for patient rotation.        LABS:   Results for orders placed or performed during the hospital encounter of 07/11/25   CBC with Auto Differential   Result Value Ref Range    WBC 6.7 3.5 - 11.0 k/uL    RBC 3.99 (L) 4.0 - 5.2 m/uL    Hemoglobin 10.2 (L) 12.0 - 16.0 g/dL    Hematocrit 32.8 (L) 36 - 46 %    MCV 82.3 80 - 100 fL    MCH 25.7 (L) 26 - 34 pg    MCHC 31.2 31 - 37 g/dL    RDW 15.3 12.5 - 15.4 %    Platelets 230 140 - 450 k/uL    MPV 8.5 6.0 - 12.0 fL    Neutrophils % 70 (H) 36 - 66 %    Lymphocytes % 20 (L) 24 - 44 %    Monocytes % 8 2 - 11 %    Eosinophils % 1 1 - 4 %    Basophils % 1 0 - 2 %    Neutrophils Absolute 4.68 1.8 - 7.7 k/uL    Lymphocytes Absolute 1.34 1.0 - 4.8 k/uL

## 2025-07-12 NOTE — DISCHARGE INSTRUCTIONS
Please understand that at this time there is no evidence for a more serious underlying process, but that early in the process of an illness or injury, an emergency department workup can be falsely reassuring.  You should contact your family doctor within the next 48 hours for a follow up appointment    THANK YOU!!!    From [facility] and [facility]    On behalf of the Emergency Department staff at [facility], I would like to thank you for giving us the opportunity to address your health care needs and concerns.    We hope that during your visit, our service was delivered in a professional and caring manner. Please keep [facility] in mind as we walk with you down the path to your own personal wellness.     Please expect an automated text message or email from us so we can ask a few questions about your health and progress. Based on your answers, a clinician may call you back to offer help and instructions.    Please understand that early in the process of an illness or injury, an emergency department workup can be falsely reassuring.  If you notice any worsening, changing or persistent symptoms please call your family doctor or return to the ER immediately.     Tell us how we did during your visit at [email or web address]   and let us know about your experience